# Patient Record
Sex: FEMALE | Race: WHITE
[De-identification: names, ages, dates, MRNs, and addresses within clinical notes are randomized per-mention and may not be internally consistent; named-entity substitution may affect disease eponyms.]

---

## 2018-06-20 ENCOUNTER — HOSPITAL ENCOUNTER (OUTPATIENT)
Dept: HOSPITAL 76 - SDS | Age: 28
Discharge: HOME | End: 2018-06-20
Attending: INTERNAL MEDICINE
Payer: COMMERCIAL

## 2018-06-20 VITALS — SYSTOLIC BLOOD PRESSURE: 90 MMHG | DIASTOLIC BLOOD PRESSURE: 54 MMHG

## 2018-06-20 DIAGNOSIS — J45.909: ICD-10-CM

## 2018-06-20 DIAGNOSIS — K29.50: ICD-10-CM

## 2018-06-20 DIAGNOSIS — Q85.8: ICD-10-CM

## 2018-06-20 DIAGNOSIS — R10.13: Primary | ICD-10-CM

## 2018-06-20 LAB
HCG UR QL: NEGATIVE
SP GR UR STRIP.AUTO: <=1.005 (ref 1–1.03)

## 2018-06-20 PROCEDURE — 0DB78ZX EXCISION OF STOMACH, PYLORUS, VIA NATURAL OR ARTIFICIAL OPENING ENDOSCOPIC, DIAGNOSTIC: ICD-10-PCS | Performed by: INTERNAL MEDICINE

## 2018-06-20 PROCEDURE — 81025 URINE PREGNANCY TEST: CPT

## 2018-06-20 PROCEDURE — 43239 EGD BIOPSY SINGLE/MULTIPLE: CPT

## 2019-01-11 ENCOUNTER — HOSPITAL ENCOUNTER (EMERGENCY)
Dept: HOSPITAL 76 - ED | Age: 29
Discharge: ANOTHER HOSPITAL | End: 2019-01-11
Payer: COMMERCIAL

## 2019-01-11 ENCOUNTER — HOSPITAL ENCOUNTER (OUTPATIENT)
Dept: HOSPITAL 76 - EMS | Age: 29
Discharge: ANOTHER HOSPITAL | End: 2019-01-11
Attending: SURGERY
Payer: COMMERCIAL

## 2019-01-11 VITALS — DIASTOLIC BLOOD PRESSURE: 69 MMHG | SYSTOLIC BLOOD PRESSURE: 103 MMHG

## 2019-01-11 DIAGNOSIS — R56.9: Primary | ICD-10-CM

## 2019-01-11 DIAGNOSIS — Q85.8: ICD-10-CM

## 2019-01-11 DIAGNOSIS — I62.9: Primary | ICD-10-CM

## 2019-01-11 LAB
ALBUMIN DIAFP-MCNC: 4 G/DL (ref 3.2–5.5)
ALBUMIN/GLOB SERPL: 1.1 {RATIO} (ref 1–2.2)
ALP SERPL-CCNC: 69 IU/L (ref 42–121)
ALT SERPL W P-5'-P-CCNC: 15 IU/L (ref 10–60)
ANION GAP SERPL CALCULATED.4IONS-SCNC: 10 MMOL/L (ref 6–13)
AST SERPL W P-5'-P-CCNC: 22 IU/L (ref 10–42)
BASOPHILS NFR BLD AUTO: 0 10^3/UL (ref 0–0.1)
BASOPHILS NFR BLD AUTO: 0.3 %
BILIRUB BLD-MCNC: 0.6 MG/DL (ref 0.2–1)
BUN SERPL-MCNC: 7 MG/DL (ref 6–20)
CALCIUM UR-MCNC: 9.4 MG/DL (ref 8.5–10.3)
CHLORIDE SERPL-SCNC: 101 MMOL/L (ref 101–111)
CLARITY UR REFRACT.AUTO: CLEAR
CO2 SERPL-SCNC: 25 MMOL/L (ref 21–32)
CREAT SERPLBLD-SCNC: 0.5 MG/DL (ref 0.4–1)
EOSINOPHIL # BLD AUTO: 0.1 10^3/UL (ref 0–0.7)
EOSINOPHIL NFR BLD AUTO: 1 %
ERYTHROCYTE [DISTWIDTH] IN BLOOD BY AUTOMATED COUNT: 13.2 % (ref 12–15)
GFRSERPLBLD MDRD-ARVRAT: 145 ML/MIN/{1.73_M2} (ref 89–?)
GLOBULIN SER-MCNC: 3.6 G/DL (ref 2.1–4.2)
GLUCOSE SERPL-MCNC: 111 MG/DL (ref 70–100)
GLUCOSE UR QL STRIP.AUTO: NEGATIVE MG/DL
HCG UR QL: NEGATIVE
HGB UR QL STRIP: 13.5 G/DL (ref 12–16)
KETONES UR QL STRIP.AUTO: NEGATIVE MG/DL
LIPASE SERPL-CCNC: 30 U/L (ref 22–51)
LYMPHOCYTES # SPEC AUTO: 1.6 10^3/UL (ref 1.5–3.5)
LYMPHOCYTES NFR BLD AUTO: 13.1 %
MCH RBC QN AUTO: 31.4 PG (ref 27–31)
MCHC RBC AUTO-ENTMCNC: 34.8 G/DL (ref 32–36)
MCV RBC AUTO: 90.1 FL (ref 81–99)
MONOCYTES # BLD AUTO: 0.5 10^3/UL (ref 0–1)
MONOCYTES NFR BLD AUTO: 4.3 %
NEUTROPHILS # BLD AUTO: 9.7 10^3/UL (ref 1.5–6.6)
NEUTROPHILS # SNV AUTO: 12 X10^3/UL (ref 4.8–10.8)
NEUTROPHILS NFR BLD AUTO: 81.3 %
NITRITE UR QL STRIP.AUTO: NEGATIVE
PDW BLD AUTO: 9.3 FL (ref 7.9–10.8)
PH UR STRIP.AUTO: 7 PH (ref 5–7.5)
PLATELET # BLD: 211 10^3/UL (ref 130–450)
PROT SPEC-MCNC: 7.6 G/DL (ref 6.7–8.2)
PROT UR STRIP.AUTO-MCNC: NEGATIVE MG/DL
RBC # UR STRIP.AUTO: (no result) /UL
RBC # URNS HPF: (no result) /HPF (ref 0–5)
RBC MAR: 4.29 10^6/UL (ref 4.2–5.4)
SODIUM SERPLBLD-SCNC: 136 MMOL/L (ref 135–145)
SP GR UR STRIP.AUTO: 1.01 (ref 1–1.03)
SP GR UR STRIP.AUTO: 1.01 (ref 1–1.03)
SQUAMOUS URNS QL MICRO: (no result)
UROBILINOGEN UR QL STRIP.AUTO: (no result) E.U./DL
UROBILINOGEN UR STRIP.AUTO-MCNC: NEGATIVE MG/DL

## 2019-01-11 PROCEDURE — 96365 THER/PROPH/DIAG IV INF INIT: CPT

## 2019-01-11 PROCEDURE — 99291 CRITICAL CARE FIRST HOUR: CPT

## 2019-01-11 PROCEDURE — 96375 TX/PRO/DX INJ NEW DRUG ADDON: CPT

## 2019-01-11 PROCEDURE — 93005 ELECTROCARDIOGRAM TRACING: CPT

## 2019-01-11 PROCEDURE — 80053 COMPREHEN METABOLIC PANEL: CPT

## 2019-01-11 PROCEDURE — 99285 EMERGENCY DEPT VISIT HI MDM: CPT

## 2019-01-11 PROCEDURE — 36415 COLL VENOUS BLD VENIPUNCTURE: CPT

## 2019-01-11 PROCEDURE — 85025 COMPLETE CBC W/AUTO DIFF WBC: CPT

## 2019-01-11 PROCEDURE — 83690 ASSAY OF LIPASE: CPT

## 2019-01-11 PROCEDURE — 87086 URINE CULTURE/COLONY COUNT: CPT

## 2019-01-11 PROCEDURE — 81025 URINE PREGNANCY TEST: CPT

## 2019-01-11 PROCEDURE — 70450 CT HEAD/BRAIN W/O DYE: CPT

## 2019-01-11 PROCEDURE — 81003 URINALYSIS AUTO W/O SCOPE: CPT

## 2019-01-11 PROCEDURE — 81001 URINALYSIS AUTO W/SCOPE: CPT

## 2019-01-11 NOTE — CT REPORT
Reason:  poss sz

Procedure Date:  01/11/2019   

Accession Number:  [id number] / [id number]                    

Procedure:  CT  - Head W/O CPT Code:  

 

FULL RESULT:

 

 

EXAM:

CT HEAD

 

EXAM DATE: 1/11/2019 01:35 PM.

 

CLINICAL HISTORY: Poss sz.

 

COMPARISON: None.

 

TECHNIQUE: Multiaxial CT images were obtained from the foramen magnum to 

the vertex. Reformats: Sagittal and coronal. IV contrast: None.

 

In accordance with CT protocol optimization, one or more of the following 

dose reduction techniques were utilized for this exam: automated exposure 

control, adjustment of mA and/or KV based on patient size, or use of 

iterative reconstructive technique.

 

FINDINGS:

Parenchyma: Rounded intraparenchymal hemorrhage in the right frontal lobe 

measuring about 2.4 cm in diameter with surrounding edema and localized 

mass-effect, but no midline shift at this time. Otherwise unremarkable. 

Good gray-white differentiation.

 

Extraaxial Spaces: Normal for age. No subdural or epidural collections.

 

Ventricles: Normal in size and position.

 

Sinuses and Orbits: Imaged paranasal sinuses, orbits, and mastoids show 

no significant abnormality.

 

Bones: Unremarkable.

 

Other: None.

IMPRESSION: Right frontal hemorrhage; differential considerations include 

bleeding into an underlying tumor versus bleeding from AVM or other 

vascular lesion. MR scan is recommended.

 

[facility]

 

The above findings  were discussed with Matthew Recinos by Dr. Jefferson Morelos at 13:45 hrs on 1/11/19.

## 2019-01-11 NOTE — ED PHYSICIAN DOCUMENTATION
PD HPI SYNCOPE





- Stated complaint


Stated Complaint: SYNCOPE





- Chief complaint


Chief Complaint: Neuro





- History obtained from


History obtained from: Patient, Family ()





- History of Present Illness


Witnessed: Witnessed (This is a 28-year-old woman with history of asthma and 

allergies as well as Cowden syndrome who had a seizure versus syncopal episode 

today.  She downloaded a new game on her phone which involves a lot of colors 

and lights.  While she was playing and she felt like her eyes were fluttering.  

She put the game down in about 2 minutes later she was walking and started to 

feel dizzy briefly and then passed out.  The  noted some shaking on the 

way down but there was no prolonged seizure type activity and there was no 

significant postictal period.  She was not incontinent nor did she bite her 

tongue.  She is never had a seizure before.  She is not use alcohol 

significantly.)





Review of Systems


Ten Systems: 10 systems reviewed and negative


Constitutional: reports: Fatigue.  denies: Fever, Chills


Ears: denies: Drainage/discharge


Throat: denies: Sore throat


Cardiac: denies: Chest pain / pressure, Palpitations


Respiratory: denies: Dyspnea, Cough


GI: denies: Abdominal Pain





PD PAST MEDICAL HISTORY





- Past Medical History


Past Medical History: Yes


Cardiovascular: None


Respiratory: Asthma, Other


Endocrine/Autoimmune: None


GI: GERD, Other


: None


HEENT: None


Psych: None


Musculoskeletal: None


Derm: Eczema





- Past Surgical History


Past Surgical History: Yes


General: Colonoscopy, EGD


HEENT: Tonsil/Adenoidectomy





- Present Medications


Home Medications: 


                                Ambulatory Orders











 Medication  Instructions  Recorded  Confirmed


 


Albuterol Sulfate [Proair Hfa 2 PRN 06/19/18 





Inhaler]   


 


Fluticasone/Salmeterol [Advair 1 DAILY 06/19/18 06/19/18





250-50 Diskus]   


 


Olopatadine HCl [Patanol] 2 drops EACHEYE DAILY 06/19/18 


 


RX: Fluticasone [Flonase] 1 DAILY 06/19/18 


 


Norethindrone-E.estradiol-Iron 1 tab DAILY 01/11/19 01/11/19





[Junel Fe 24 Tablet]   


 


RX: Montelukast [Singulair] 1 tab DAILY 01/11/19 01/11/19














- Allergies


Allergies/Adverse Reactions: 


                                    Allergies











Allergy/AdvReac Type Severity Reaction Status Date / Time


 


No Known Drug Allergies Allergy   Verified 01/11/19 11:56














- Social History


Does the pt smoke?: No


Smoking Status: Never smoker


Does the pt have substance abuse?: No





PD ED PE NORMAL





- Vitals


Vital signs reviewed: Yes





- General


General: Alert and oriented X 3, No acute distress





- HEENT


HEENT: PERRL, EOMI, Pharynx benign





- Neck


Neck: Supple, no meningeal sign, No bony TTP





- Cardiac


Cardiac: RRR, No murmur





- Respiratory


Respiratory: No respiratory distress, Clear bilaterally





- Abdomen


Abdomen: Normal bowel sounds, Soft, Non tender





- Back


Back: No CVA TTP, No spinal TTP





- Derm


Derm: Normal color, Warm and dry





- Extremities


Extremities: No edema, No calf tenderness / cord





- Neuro


Neuro: Alert and oriented X 3, CNs 2-12 intact, Normal speech


Eye Opening: Spontaneous


Motor: Obeys Commands


Verbal: Oriented


GCS Score: 15





- Psych


Psych: Normal mood, Normal affect





Results





- Vitals


Vitals: 


                               Vital Signs - 24 hr











  01/11/19 01/11/19 01/11/19





  11:40 12:00 12:30


 


Temperature 36 C L  


 


Heart Rate 110 H 108 H 101 H


 


Respiratory 16 16 17





Rate   


 


Blood Pressure 110/67 105/61 101/70


 


O2 Saturation 100 100 99














  01/11/19 01/11/19 01/11/19





  13:00 13:15 13:30


 


Temperature   


 


Heart Rate 104 H  100


 


Respiratory 20  16





Rate   


 


Blood Pressure 104/69 104/64 116/66


 


O2 Saturation 99  100














  01/11/19 01/11/19





  14:00 14:15


 


Temperature  


 


Heart Rate 90 96


 


Respiratory 16 16





Rate  


 


Blood Pressure 104/67 103/69


 


O2 Saturation 97 98








                                     Oxygen











O2 Source                      Room air

















- EKG (time done)


  ** 1145


Rate: Rate (enter#) (102)


Rhythm: Sinus tachycardia


Axis: Normal


Intervals: Normal PA


QRS: Normal


Ischemia: Normal ST segments


Computer interpretation: Agree with computer





- Labs


Labs: 


                                Laboratory Tests











  01/11/19 01/11/19 01/11/19





  12:16 12:16 13:12


 


WBC  12.0 H  


 


RBC  4.29  


 


Hgb  13.5  


 


Hct  38.7  


 


MCV  90.1  


 


MCH  31.4 H  


 


MCHC  34.8  


 


RDW  13.2  


 


Plt Count  211  


 


MPV  9.3  


 


Neut # (Auto)  9.7 H  


 


Lymph # (Auto)  1.6  


 


Mono # (Auto)  0.5  


 


Eos # (Auto)  0.1  


 


Baso # (Auto)  0.0  


 


Absolute Nucleated RBC  0.01  


 


Nucleated RBC %  0.0  


 


Sodium   136 


 


Potassium   3.6 


 


Chloride   101 


 


Carbon Dioxide   25 


 


Anion Gap   10.0 


 


BUN   7 


 


Creatinine   0.5 


 


Estimated GFR (MDRD)   145 


 


Glucose   111 H 


 


Calcium   9.4 


 


Total Bilirubin   0.6 


 


AST   22 


 


ALT   15 


 


Alkaline Phosphatase   69 


 


Total Protein   7.6 


 


Albumin   4.0 


 


Globulin   3.6 


 


Albumin/Globulin Ratio   1.1 


 


Lipase   30 


 


Urine Color   


 


Urine Clarity   


 


Urine pH   


 


Ur Specific Gravity    1.015


 


Urine Protein   


 


Urine Glucose (UA)   


 


Urine Ketones   


 


Urine Occult Blood   


 


Urine Nitrite   


 


Urine Bilirubin   


 


Urine Urobilinogen   


 


Ur Leukocyte Esterase   


 


Urine RBC   


 


Urine WBC   


 


Ur Squamous Epith Cells   


 


Urine Bacteria   


 


Ur Microscopic Review   


 


Urine Culture Comments   


 


Urine HCG, Qual    NEGATIVE














  01/11/19





  13:12


 


WBC 


 


RBC 


 


Hgb 


 


Hct 


 


MCV 


 


MCH 


 


MCHC 


 


RDW 


 


Plt Count 


 


MPV 


 


Neut # (Auto) 


 


Lymph # (Auto) 


 


Mono # (Auto) 


 


Eos # (Auto) 


 


Baso # (Auto) 


 


Absolute Nucleated RBC 


 


Nucleated RBC % 


 


Sodium 


 


Potassium 


 


Chloride 


 


Carbon Dioxide 


 


Anion Gap 


 


BUN 


 


Creatinine 


 


Estimated GFR (MDRD) 


 


Glucose 


 


Calcium 


 


Total Bilirubin 


 


AST 


 


ALT 


 


Alkaline Phosphatase 


 


Total Protein 


 


Albumin 


 


Globulin 


 


Albumin/Globulin Ratio 


 


Lipase 


 


Urine Color  YELLOW


 


Urine Clarity  CLEAR


 


Urine pH  7.0


 


Ur Specific Gravity  1.015


 


Urine Protein  NEGATIVE


 


Urine Glucose (UA)  NEGATIVE


 


Urine Ketones  NEGATIVE


 


Urine Occult Blood  TRACE-LYSE


 


Urine Nitrite  NEGATIVE


 


Urine Bilirubin  NEGATIVE


 


Urine Urobilinogen  0.2 (NORMAL)


 


Ur Leukocyte Esterase  TRACE H


 


Urine RBC  0-5


 


Urine WBC  0-3


 


Ur Squamous Epith Cells  FEW Squamous


 


Urine Bacteria  Rare


 


Ur Microscopic Review  INDICATED


 


Urine Culture Comments  INDICATED


 


Urine HCG, Qual 














- Rads (name of study)


  ** CT Head


Radiology: EMP read contemporaneously (Right frontal hemorrhage from either 

underlying tumor or vascular lesion.)





PD MEDICAL DECISION MAKING





- ED course


ED course: 





28-year-old woman presents with what sounds like either syncope versus seizure 

today.  She has underlying history of Cowden syndrome.





Intracranial imaging demonstrates what looks like a right frontal tumor with 

spontaneous hemorrhage into itself, there is insignificant mass-effect.  She was

 loaded with Keppra and [facility] was called for consultation and she was accepted

 by Dr Downing there for a higher level of care including neurology and 

neurosurgery consultation at 2:05 PM.  Cobras were completed.  I think she needs

 to go by air to minimize out-of-hospital transport time given the high risk of 

decompensation.





Also spoke with Dr. Albert Ramírez, the intensivist at [facility].





- Critical Care


Time(min): 42


Time Includes: Direct patient care, Review records, Reassess patient, Document 

care, Coordinate care, Medical consult, Family consult for tx dec


Data interpretation: Labs, Pulse ox


Procedures included in critical care time: Peripheral IV


Procedures excluded from critical care time: EKG





Departure





- Departure


Disposition: 02 Transfer Acute Care Hosp


Clinical Impression: 


 Intracranial hemorrhage





Condition: Serious


Discharge Date/Time: 01/11/19 15:10

## 2019-03-08 ENCOUNTER — HOSPITAL ENCOUNTER (OUTPATIENT)
Dept: HOSPITAL 76 - DI | Age: 29
Discharge: HOME | End: 2019-03-08
Attending: FAMILY MEDICINE
Payer: COMMERCIAL

## 2019-03-08 DIAGNOSIS — I63.9: Primary | ICD-10-CM

## 2019-03-08 PROCEDURE — 70553 MRI BRAIN STEM W/O & W/DYE: CPT

## 2019-03-08 NOTE — MRI REPORT
Reason:  CEREBRAL INFARCTION, UNSPECIFIED

Procedure Date:  03/08/2019   

Accession Number:  [id number] / [id number]                    

Procedure:  MRI - Brain W/WO CPT Code:  

 

FULL RESULT:

 

 

EXAM:

MRI BRAIN WITHOUT AND WITH CONTRAST

 

EXAM DATE: 3/8/2019 11:19 AM.

 

CLINICAL HISTORY: Cerebral infarction, unspecified. History of right 

frontal hemorrhage.

 

COMPARISON: MR BRAIN WITHOUT AND WITH CONTRAST / MR ANGIO HEAD 01/11/2019 

8:13 PM.

HEAD W/O 01/11/2019 1:07 PM.

 

TECHNIQUE: Multiplanar, multisequence T1-weighted and fluid-sensitive MR 

sequences of the brain were performed. Sequences optimized for routine 

evaluation. Other: None. IV Contrast: 6 cc Gadavist.

 

FINDINGS:

Brain Volume: Normal for age.

 

Parenchyma: Expected involution of previously noted hemorrhage in the 

superior lateral right frontal lobe is noted. Hematoma now measures 

approximately 19 x 19 x 22 mm. Heterogeneous areas of increased and 

decreased T1 and T2 signal is seen. Hypointense magnetic susceptibility 

is present. Mild surrounding white matter edema is seen. There is a 

subtle faint rind of enhancement surrounding the inferior and posterior 

aspect of the hematoma.

 

Note is made of a small developmental venous anomaly anteromedially in 

the right frontal lobe. No adjacent parenchymal gliosis or hemorrhage is 

seen. This is separate from the parenchymal hematoma.

No restricted diffusion is seen to suggest acute infarct. Mild scattered 

punctate foci of subcortical and deep white matter T2/FLAIR bright signal 

is seen in the cerebral hemispheres predominating in the frontal lobes.

 

Ventricles/Cisterns: No hydrocephalus. Note is made of a thin cavum 

septum pellucidum and cavum vergae. No abnormal extra-axial fluid 

collection or hemorrhage.

 

Orbits: Symmetric and unremarkable.

 

Sella Turcica: The pituitary gland, cavernous sinuses, suprasellar 

cistern and optic chiasm are unremarkable.

 

IAC: Symmetric and unremarkable.

 

Vasculature: Normal signal flow void is seen in the major arterial 

structures at the skull base. The dural sinuses are patent and enhance 

normally.

 

Sinuses: No acute sinus disease.

 

Bones: No focal pathologic appearing marrow signal changes.

 

Other: None.

 

IMPRESSION:

1. Expected mild involution of previously noted parenchymal hematoma in 

the superior lateral right frontal lobe. This now measures up to 22 mm in 

size. There is associated faint rind of peripheral enhancement and 

surrounding white matter edema. This could all be secondary to evolution 

of hematoma. Continued surveillance with 3 month follow-up may be of 

value.

 

2. Note is made of a small developmental venous anomaly in the 

anteromedial superior right frontal lobe. This is unchanged. No 

associated parenchymal gliosis or hemorrhage is seen.

 

[facility]

ADDENDUM: 03/08/19 17:39

 

COMPARISON: MRI of the cervical and thoracic spine 01/14/2019.

 

FINDINGS: On sagittal T1 series thin syrinx centrally within the 

partially visualized thoracic spinal cord is noted at the C4 level. This 

is unchanged.